# Patient Record
(demographics unavailable — no encounter records)

---

## 2024-10-07 NOTE — HISTORY OF PRESENT ILLNESS
[FreeTextEntry1] : 35-year-old white female para 5 here for follow-up visit.  Patient is status post vaginal delivery on 10-2-24 of a female infant.  She had peripartum gestational hypertension and is here for blood pressure check.  Patient reports some nausea as well at home.  She reports occasional high blood pressures at home.  She denies any headaches epigastric pain or visual symptoms.

## 2024-10-07 NOTE — DISCUSSION/SUMMARY
[FreeTextEntry1] : I discussed that some of her diastolics are in the severe range as she has had blood pressures of 150s over 100s.  In addition she has been reporting some nausea.  I am sending her to labor and delivery for further evaluation.  I discussed possible need for short-term antihypertensives.  Patient is very emotional but I reassured her.

## 2024-10-17 NOTE — HISTORY OF PRESENT ILLNESS
[FreeTextEntry1] : 35-year-old white female para 5 here for follow-up visit.  Patient is status post vaginal delivery on 10/2/24 over female infant and developed postpartum hypertension.  Patient was sent back to labor and delivery because some of her blood pressures were in the severe range and noted to have slight elevation in liver enzymes.  She was given magnesium sulfate for 24 hours and eventually sent home on 60 mg of Procardia XL.  Patient reports feeling great now.  Her blood pressures are 1 teens to 120s 70s to 80s at home.  She denies any headaches, epigastric pain or visual symptoms.  She is scheduled to see a cardiologist at the end of next week

## 2024-10-17 NOTE — DISCUSSION/SUMMARY
[FreeTextEntry1] : I discussed the clinical picture with her.  We discussed lowering her antihypertensive to 30 mg of Procardia XL daily.  Prescription was provided.  She will continue to monitor her blood pressures and follow-up with cardiology.  I again also discussed her high lifetime risk of developing hypertension with expectation that the blood pressure now will eventually normalize without medication

## 2024-10-24 NOTE — CARDIOLOGY SUMMARY
[de-identified] : ECG today showed sinus rhythm with a ventricular rate of 65 bpm. MN interval 150 ms and QRS duration of 82 ms. Normal axis and intervals. QTc 416 ms. No chamber enlargement or hypertrophy. No ST/T changes.

## 2024-10-24 NOTE — REASON FOR VISIT
[Hypertension] : hypertension [Spouse] : spouse [FreeTextEntry3] : Tara Lowry, DO - [address] [place] [address] [FreeTextEntry1] : History was provided by patient and chart review.

## 2024-10-24 NOTE — DISCUSSION/SUMMARY
[FreeTextEntry1] : Ms. Stacie Jade is a 35-year-old woman with  1. Recent postpartum preeclampsia. She was referred specifically for blood pressure management.  The patient appears to be doing well from a cardiovascular standpoint and denies angina. There is no evidence of acute decompensated heart failure or clinically significant arrhythmia or valvular heart disease at this time. NYHA class 1.    ECG is normal.   BP is borderline but I think it is reasonable to discontinue the nifedipine at this time with a 2-3 week follow up with her PCP. I advised her to find a PCP locally. I will have my nurse team call her in 1 week and inquire about her BP readings.   She would require low-dose aspirin with any subsequent pregnancy.    There is no consensus as to how patients with prior preeclampsia should be followed postpartum, or in the years after the affected pregnancy, including the type and frequency of screening for cardiovascular disease (CVD). With that being said, in regard to long-term cardiovascular follow-up and prognosis, pre-eclampsia is associated with an increased risk of hypertension, heart failure, renal disease, stroke, venous thromboembolism and coronary artery disease. We discussed the link between preeclampsia and future CVD. I encouraged breastfeeding as this will decrease cardio-metabolic risk factors and is associated with decreased long-term CVD. Home blood pressure (BP) monitoring is advised. Goal BP <130/80 mm Hg. During breastfeeding, the acceptable medications for BP include labetalol, nifedipine, furosemide, metoprolol, captopril, enalapril, hydralazine and spironolactone.  In general, I recommend avoiding estrogen in the future even if blood pressure is well controlled (U.S. Medical Eligibility Criteria for Contraceptive Use 3=a condition for which the theoretical or proven risks usually outweigh the advantages if the contraceptive method is used). She should be evaluated annually by her primary care physician and does not necessarily need to see a cardiologist such as myself.   I recommended lifestyle modifications necessary for long-term cardiovascular health such as a dietary pattern associated with reduced atherosclerotic cardiovascular disease risk favoring a diet of vegetables, fruits, legumes, nuts, whole grains, and fish. A diet containing reduced amounts of cholesterol and sodium can also be beneficial. Replacement of saturated fat with dietary mono- and poly-unsaturated fats was recommended as well as minimizing the intake of trans fats, processed meats, refined carbohydrates, and sweetened beverages. I recommended stress reduction therapy, good sleep hygiene, age- and risk-appropriate malignancy screening through primary care doctor, seasonal influenza vaccine and other vaccines according to the CDC adult vaccination schedule through primary care doctor, medication adherence, and regular activities (at least 150 minutes per week of accumulated moderate-intensity or 75 minutes per week of vigorous-intensity aerobic physical activity) per AHA/ACC standard for long term cardiovascular risk reduction.   The patient is aware of alarm cardiac type symptoms, will call with questions or concerns and is aware to activate 9-1-1 and/or present to the closest emergency department if concerns arise.   I recommend follow up with me as needed. She should see her PCP in 2-3 weeks with a blood pressure check. I recommend her PCP check fasting lipids and HbA1c during follow up. All questions were answered to the patient's satisfaction. Return precautions were given and the patient verbalized understanding and is agreeable with plan of care. Thank you for the opportunity to participate in the care of Ms. Jade. Please do not hesitate to reach out to me with questions or concerns.   Karlo Landis DO, FACC Cardio-Obstetrics Cardiologist Adult Congenital Heart Disease Cardiologist [address], [address] [place] [address] [EKG obtained to assist in diagnosis and management of assessed problem(s)] : EKG obtained to assist in diagnosis and management of assessed problem(s)

## 2024-10-24 NOTE — CARDIOLOGY SUMMARY
[de-identified] : ECG today showed sinus rhythm with a ventricular rate of 65 bpm. ME interval 150 ms and QRS duration of 82 ms. Normal axis and intervals. QTc 416 ms. No chamber enlargement or hypertrophy. No ST/T changes.

## 2024-10-24 NOTE — HISTORY OF PRESENT ILLNESS
[FreeTextEntry1] : Ms. Stacie Jade was seen at the [facility] located in [place], [place], on 10/24/2024.  The patient is a 35-year-old woman with recent postpartum preeclampsia. She was referred specifically for blood pressure management.  I had the opportunity of meeting Stacie for the first time today in clinic.  She is post vaginal delivery on 10/3/24 to a female infant and developed postpartum hypertension. She was sent back to labor and delivery because some of her blood pressures were in the severe range and noted to have slight elevation in liver enzymes. She was given magnesium sulfate for 24 hours and eventually sent home on nifedipine 60 mg daily. Her dose was decreased to 30 mg daily by Dr. Mckenzie on 10/17/2024.    No personal history of myocardial infarction, coronary artery disease, arrhythmias, heart failure, myocarditis, pericarditis or valvular disease.   No history of diabetes mellitus or lipid disorder. Other past medical and surgical history listed below.   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 See other social and family history below.   Exercise: she is active at baseline and takes care of her 5 children. She enjoys walks.

## 2024-10-24 NOTE — REASON FOR VISIT
[Hypertension] : hypertension [Spouse] : spouse [FreeTextEntry3] : Tara Lowry, DO - [address] [place] [address] [FreeTextEntry1] : History was provided by patient and chart review. yes

## 2024-10-24 NOTE — HISTORY OF PRESENT ILLNESS
[FreeTextEntry1] : Ms. Stacie Jade was seen at the [facility] located in [place], [place], on 10/24/2024.  The patient is a 35-year-old woman with recent postpartum preeclampsia. She was referred specifically for blood pressure management.  I had the opportunity of meeting Stacie for the first time today in clinic.  She is post vaginal delivery on 10/3/24 to a female infant and developed postpartum hypertension. She was sent back to labor and delivery because some of her blood pressures were in the severe range and noted to have slight elevation in liver enzymes. She was given magnesium sulfate for 24 hours and eventually sent home on nifedipine 60 mg daily. Her dose was decreased to 30 mg daily by Dr. Mckenzie on 10/17/2024.    No personal history of myocardial infarction, coronary artery disease, arrhythmias, heart failure, myocarditis, pericarditis or valvular disease.   No history of diabetes mellitus or lipid disorder. Other past medical and surgical history listed below.   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 See other social and family history below.   Exercise: she is active at baseline and takes care of her 5 children. She enjoys walks.

## 2024-10-24 NOTE — DISCUSSION/SUMMARY
[FreeTextEntry1] : Ms. Stacie Jade is a 35-year-old woman with  1. Recent postpartum preeclampsia. She was referred specifically for blood pressure management.  The patient appears to be doing well from a cardiovascular standpoint and denies angina. There is no evidence of acute decompensated heart failure or clinically significant arrhythmia or valvular heart disease at this time. NYHA class 1.    ECG is normal.   BP is borderline but I think it is reasonable to discontinue the nifedipine at this time with a 2-3 week follow up with her PCP. I advised her to find a PCP locally. I will have my nurse team call her in 1 week and inquire about her BP readings.   She would require low-dose aspirin with any subsequent pregnancy.    There is no consensus as to how patients with prior preeclampsia should be followed postpartum, or in the years after the affected pregnancy, including the type and frequency of screening for cardiovascular disease (CVD). With that being said, in regard to long-term cardiovascular follow-up and prognosis, pre-eclampsia is associated with an increased risk of hypertension, heart failure, renal disease, stroke, venous thromboembolism and coronary artery disease. We discussed the link between preeclampsia and future CVD. I encouraged breastfeeding as this will decrease cardio-metabolic risk factors and is associated with decreased long-term CVD. Home blood pressure (BP) monitoring is advised. Goal BP <130/80 mm Hg. During breastfeeding, the acceptable medications for BP include labetalol, nifedipine, furosemide, metoprolol, captopril, enalapril, hydralazine and spironolactone.  In general, I recommend avoiding estrogen in the future even if blood pressure is well controlled (U.S. Medical Eligibility Criteria for Contraceptive Use 3=a condition for which the theoretical or proven risks usually outweigh the advantages if the contraceptive method is used). She should be evaluated annually by her primary care physician and does not necessarily need to see a cardiologist such as myself.   I recommended lifestyle modifications necessary for long-term cardiovascular health such as a dietary pattern associated with reduced atherosclerotic cardiovascular disease risk favoring a diet of vegetables, fruits, legumes, nuts, whole grains, and fish. A diet containing reduced amounts of cholesterol and sodium can also be beneficial. Replacement of saturated fat with dietary mono- and poly-unsaturated fats was recommended as well as minimizing the intake of trans fats, processed meats, refined carbohydrates, and sweetened beverages. I recommended stress reduction therapy, good sleep hygiene, age- and risk-appropriate malignancy screening through primary care doctor, seasonal influenza vaccine and other vaccines according to the CDC adult vaccination schedule through primary care doctor, medication adherence, and regular activities (at least 150 minutes per week of accumulated moderate-intensity or 75 minutes per week of vigorous-intensity aerobic physical activity) per AHA/ACC standard for long term cardiovascular risk reduction.   The patient is aware of alarm cardiac type symptoms, will call with questions or concerns and is aware to activate 9-1-1 and/or present to the closest emergency department if concerns arise.   I recommend follow up with me as needed. She should see her PCP in 2-3 weeks with a blood pressure check. I recommend her PCP check fasting lipids and HbA1c during follow up. All questions were answered to the patient's satisfaction. Return precautions were given and the patient verbalized understanding and is agreeable with plan of care. Thank you for the opportunity to participate in the care of Ms. Jade. Please do not hesitate to reach out to me with questions or concerns.   Karlo Landis DO, FACC Cardio-Obstetrics Cardiologist Adult Congenital Heart Disease Cardiologist [address], [address]  [EKG obtained to assist in diagnosis and management of assessed problem(s)] : EKG obtained to assist in diagnosis and management of assessed problem(s)

## 2024-10-30 NOTE — HISTORY OF PRESENT ILLNESS
[FreeTextEntry1] : 35-year-old white female para 5 status post vaginal delivery on 10/3/2024 with postpartum hypertension here for follow-up visit.  Patient had some severe range blood pressures and she was given magnesium sulfate for 24 hours and sent home on Procardia XL 60 mg.  I subsequently changed her to 30 mg and now she is status post cardiology appointment.  The medication.  Patient says she feels well.  Her blood pressure today is 118/78.

## 2024-10-30 NOTE — DISCUSSION/SUMMARY
[FreeTextEntry1] : I discussed further management with the patient.  As cardiology has advised that she has a high lifetime risk of developing hypertension.  She was advised to see primary care physician for follow-up annually with this.  Patient is now normotensive.  She will follow-up for postpartum visit.

## 2024-11-18 NOTE — DISCUSSION/SUMMARY
[FreeTextEntry1] : I discussed her birth control options at length.  In general I discussed long-acting methods such as IUDs and different IUDs were discussed and including pros and cons.  I also discussed hormonal contraception and patient is currently normotensive and I discussed that there is no contraindication but she may consider the longer acting methods.  Patient will review her options and advise us.

## 2024-11-18 NOTE — PHYSICAL EXAM
[Chaperone Present] : A chaperone was present in the examining room during all aspects of the physical examination [[id number]] : A chaperone was present during the pelvic exam. [FreeTextEntry2] : serg martin [Labia Majora] : normal [Labia Minora] : normal [Normal] : normal [Uterine Adnexae] : normal

## 2025-01-30 NOTE — PLAN
[FreeTextEntry1] : #Establish care/CPE  -discussed medical history, appreciated most recent cardio note  -lab work script provided  -vaccinations:      COVID: not vaccinated      Influenza : declines      TDAP: up to date  -depression screen negative  -follow w/ GYN up to date w/ pap smear  -advised to f/u with Derm for skin cancer screening  -f/u with endo regarding pituitary adenoma  -keep BP log at home, low sodium diet

## 2025-01-30 NOTE — HISTORY OF PRESENT ILLNESS
[FreeTextEntry1] : establish care  [de-identified] : 34 yo F PMH postpartum preeclampsia, pituitary adenoma presenting today to establish care/CPE.   She follows w/ GYN.   She had a baby girl in October 2024.  Denies any complaints today.

## 2025-01-30 NOTE — ASSESSMENT
[FreeTextEntry1] : 36 yo F PMH postpartum preeclampsia, pituitary adenoma presenting today to establish care/CPE.

## 2025-01-30 NOTE — ASSESSMENT
[FreeTextEntry1] : 34 yo F PMH postpartum preeclampsia, pituitary adenoma presenting today to establish care/CPE.

## 2025-01-30 NOTE — HISTORY OF PRESENT ILLNESS
[FreeTextEntry1] : establish care  [de-identified] : 36 yo F PMH postpartum preeclampsia, pituitary adenoma presenting today to establish care/CPE.   She follows w/ GYN.   She had a baby girl in October 2024.  Denies any complaints today.

## 2025-01-30 NOTE — HEALTH RISK ASSESSMENT
[Patient reported PAP Smear was normal] : Patient reported PAP Smear was normal [No] : In the past 12 months have you used drugs other than those required for medical reasons? No [0] : 2) Feeling down, depressed, or hopeless: Not at all (0) [PHQ-2 Negative - No further assessment needed] : PHQ-2 Negative - No further assessment needed [Audit-CScore] : 0 [EFO6Oxksx] : 0 [With Family] : lives with family [PapSmearDate] : 01/24 [de-identified] :  and 5 kids  [FreeTextEntry2] : teacher  [Never] : Never Bladder non-tender and non-distended. Urine clear yellow.

## 2025-01-30 NOTE — HEALTH RISK ASSESSMENT
[Patient reported PAP Smear was normal] : Patient reported PAP Smear was normal [No] : In the past 12 months have you used drugs other than those required for medical reasons? No [0] : 2) Feeling down, depressed, or hopeless: Not at all (0) [PHQ-2 Negative - No further assessment needed] : PHQ-2 Negative - No further assessment needed [Audit-CScore] : 0 [FAF8Dcrxc] : 0 [With Family] : lives with family [PapSmearDate] : 01/24 [de-identified] :  and 5 kids  [FreeTextEntry2] : teacher  [Never] : Never

## 2025-02-08 NOTE — HISTORY OF PRESENT ILLNESS
[FreeTextEntry1] : Skin check. Dr Morel. [de-identified] : No personal or family history of cutaneous malignancy. No h/o cutaneous illnesses. Sun when younger. Careful now.

## 2025-02-08 NOTE — ASSESSMENT
[FreeTextEntry1] :   Alert, oriented, well pleasant.   Sun-exposed cutaneous exam. No evidence of cutaneous malignancy.  Brown macules and papules less than 0.6cm generalized especially trunk.  Nevi. No treatment.  Actinic damage. Reviewed sun protection. UPF clothing.  Erythematous papules trunk. Angioma. No treatment.    Follow up 1 year.

## 2025-02-08 NOTE — CONSULT LETTER
[Dear  ___] : Dear  [unfilled], [Consult Letter:] : I had the pleasure of evaluating your patient, [unfilled]. [Please see my note below.] : Please see my note below. [Consult Closing:] : Thank you very much for allowing me to participate in the care of this patient.  If you have any questions, please do not hesitate to contact me. [Sincerely,] : Sincerely, [FreeTextEntry3] : Dayron Nevarez MD, FAAD

## 2025-03-04 NOTE — ASSESSMENT
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  She was able to get pregnant in 2024 without bromocriptine and cabergoline however, she gave birth in October 2024 and has had amenorrhea since then. I ordered a repeat MRI pituitary gland with and without IV contrast. I ordered a prolactin level BUT THE PATIENT IS BREASTFEEDING SO THIS MAY RESULT IN AN ELEVATED PROLACTIN LEVEL.   Plan: 1. MRI pituitary gland with and without IV contrast  2. Labs to be done this week - fasting - 8 am to 10 am - see below 3. Follow up in 4 weeks to review results - telehealth visit ok

## 2025-03-04 NOTE — PHYSICAL EXAM
[de-identified] : General: No distress, well nourished Eyes: Normal Sclera, EOMI, PERRL ENT: Normal appearance of the nose, normal oropharynx Neck/Thyroid: No cervical lymphadenopathy, thyroid gland 20 g in size, no thyroid nodules, non-tender Respiratory: No use of accessory muscles of respiration, vesicular breath sounds heard bilaterally, no crepitations or rhonchi Cardiovascular: S1 and S2 heard and normal, no S3 or S4, no murmurs, radial pulse normal bilaterally Abdomen: soft, non-tender, no masses, normal bowel sounds Musculoskeletal: No swelling or deformities of joints of hands, no pedal edema Neurological: Normal range of motion in the hands, Normal brachioradialis reflexes bilaterally Psychiatry: Patient converses normally, good judgement and insight Skin: No rashes in hands, no nodules palpated in hands

## 2025-03-04 NOTE — HISTORY OF PRESENT ILLNESS
[FreeTextEntry1] : Problems: 1. Microprolactinoma  Microprolactinoma 1. Patient was diagnosed with a microprolactinoma in 2016. Patient had infertility and amenorrhea and was treated with bromocriptine and then cabergoline in the past. 2. Radiology: A. 2016 - MRI pituitary gland - subcenimeter (7 x 6.3 mm) pituitary microadenoma in the right side and there is deviation of the pituitary stalk.  B, 2019 - MRI pituitary gland - there is a subcemtimeter enhancement (3 x 4 mm) enhancement in the right pituitary gland, stalk deviated to the left - this is a pituitary adenoma versus cyst vs Rathke's cleft cyst 3. Labs: 2016 - 8:38 am - prolactin 89.6 (elevated) April 2024 - prolactin 42.7 (3.4 to 24.1) 4. No family history of pituitary disorders 5. Symptoms on 03/04/2025 - patient gave birth in October 2024 (patient was able to get pregnant with this child without bromocriptine or cabergoline) and patient did not menstruate since giving birth, patient is breastfeeding.  6. Meds: Patient used bromocriptine initially and then cabergoline - she stopped this in 2021/2022.